# Patient Record
Sex: FEMALE | Race: BLACK OR AFRICAN AMERICAN | URBAN - METROPOLITAN AREA
[De-identification: names, ages, dates, MRNs, and addresses within clinical notes are randomized per-mention and may not be internally consistent; named-entity substitution may affect disease eponyms.]

---

## 2017-10-12 RX ORDER — ACYCLOVIR 400 MG/1
TABLET ORAL
Qty: 15 TAB | Refills: 1 | Status: SHIPPED | OUTPATIENT
Start: 2017-10-12 | End: 2019-01-04 | Stop reason: ALTCHOICE

## 2019-01-04 ENCOUNTER — HOSPITAL ENCOUNTER (OUTPATIENT)
Dept: LAB | Age: 24
Discharge: HOME | End: 2019-01-04
Payer: COMMERCIAL

## 2019-01-04 ENCOUNTER — OFFICE VISIT (OUTPATIENT)
Dept: FAMILY MEDICINE CLINIC | Age: 24
End: 2019-01-04

## 2019-01-04 VITALS
HEART RATE: 93 BPM | TEMPERATURE: 95.4 F | WEIGHT: 140 LBS | SYSTOLIC BLOOD PRESSURE: 113 MMHG | BODY MASS INDEX: 27.48 KG/M2 | HEIGHT: 60 IN | RESPIRATION RATE: 14 BRPM | DIASTOLIC BLOOD PRESSURE: 77 MMHG

## 2019-01-04 DIAGNOSIS — N93.9 ABNORMAL VAGINAL BLEEDING: ICD-10-CM

## 2019-01-04 DIAGNOSIS — Z01.419 ENCOUNTER FOR ANNUAL ROUTINE GYNECOLOGICAL EXAMINATION: Primary | ICD-10-CM

## 2019-01-04 DIAGNOSIS — Z30.41 FAMILY PLANNING, BCP (BIRTH CONTROL PILLS) MAINTENANCE: ICD-10-CM

## 2019-01-04 DIAGNOSIS — N76.0 BACTERIAL VAGINOSIS: ICD-10-CM

## 2019-01-04 DIAGNOSIS — N89.8 VAGINAL DISCHARGE: ICD-10-CM

## 2019-01-04 DIAGNOSIS — A60.00 HERPES SIMPLEX INFECTION OF GENITOURINARY SYSTEM: ICD-10-CM

## 2019-01-04 DIAGNOSIS — B96.89 BACTERIAL VAGINOSIS: ICD-10-CM

## 2019-01-04 LAB
HCG URINE, QL. (POC): NEGATIVE
VALID INTERNAL CONTROL?: YES

## 2019-01-04 PROCEDURE — 87491 CHLMYD TRACH DNA AMP PROBE: CPT

## 2019-01-04 PROCEDURE — 88175 CYTOPATH C/V AUTO FLUID REDO: CPT

## 2019-01-04 PROCEDURE — 87798 DETECT AGENT NOS DNA AMP: CPT

## 2019-01-04 RX ORDER — ETONOGESTREL AND ETHINYL ESTRADIOL 11.7; 2.7 MG/1; MG/1
INSERT, EXTENDED RELEASE VAGINAL
Qty: 1 DEVICE | Refills: 11 | Status: SHIPPED | OUTPATIENT
Start: 2019-01-04

## 2019-01-04 RX ORDER — NORGESTIMATE AND ETHINYL ESTRADIOL 0.25-0.035
KIT ORAL
Refills: 0 | COMMUNITY
Start: 2018-10-01 | End: 2019-01-04 | Stop reason: ALTCHOICE

## 2019-01-04 RX ORDER — METRONIDAZOLE 500 MG/1
500 TABLET ORAL 2 TIMES DAILY
Qty: 14 TAB | Refills: 0 | Status: SHIPPED | OUTPATIENT
Start: 2019-01-04 | End: 2019-01-11

## 2019-01-04 RX ORDER — VALACYCLOVIR HYDROCHLORIDE 500 MG/1
500 TABLET, FILM COATED ORAL DAILY
Qty: 30 TAB | Refills: 11 | Status: SHIPPED | OUTPATIENT
Start: 2019-01-04

## 2019-01-04 NOTE — PROGRESS NOTES
HISTORY OF PRESENT ILLNESS  Mando Adams is a 26 y.o. female. HPI  Ms. Pool Bardales presents for medication refill (OCP and Acyclovir) and vaginal discharge. She has never had a Pap smear. 1) C/o vaginal discharge x 1 week - c/o odor - fishy, c/o brown and white thick discharge. Admits to pelvic pain that occurred 2 days ago - resolved. 2) Desires refill of OCP. Admits to missing pills but she set an alarm to help with this. C/o bleeding x 1 month due to frequently missing pills. 3) Hx of genital herpes. She reports infrequent outbreaks but is interested in suppression to prevent transmission to her partner. LMP: 11/25/18-12/25/18. Attributed to missed pills. Review of Systems   Gastrointestinal: Positive for abdominal pain (2 days ago - resolved). Genitourinary: Negative for dysuria, frequency and urgency. /77 (BP 1 Location: Left arm, BP Patient Position: Sitting)   Pulse 93   Temp 95.4 °F (35.2 °C) (Oral)   Resp 14   Ht 5' (1.524 m)   Wt 140 lb (63.5 kg)   LMP 11/25/2018 (Approximate)   BMI 27.34 kg/m²     Physical Exam   Constitutional: She is oriented to person, place, and time. She appears well-developed and well-nourished. No distress. HENT:   Head: Normocephalic and atraumatic. Right Ear: Tympanic membrane, external ear and ear canal normal.   Left Ear: Tympanic membrane, external ear and ear canal normal.   Nose: Nose normal.   Mouth/Throat: Uvula is midline, oropharynx is clear and moist and mucous membranes are normal. No oropharyngeal exudate, posterior oropharyngeal edema, posterior oropharyngeal erythema or tonsillar abscesses. Eyes: Conjunctivae are normal. Pupils are equal, round, and reactive to light. No scleral icterus. Neck: Neck supple. Cardiovascular: Normal rate, regular rhythm and normal heart sounds. Exam reveals no gallop. No murmur heard. Pulses:       Dorsalis pedis pulses are 2+ on the right side, and 2+ on the left side.         Posterior tibial pulses are 2+ on the right side, and 2+ on the left side. No pedal edema. Pulmonary/Chest: Effort normal and breath sounds normal. No respiratory distress. She has no decreased breath sounds. She has no wheezes. She has no rhonchi. She has no rales. Abdominal: Soft. Bowel sounds are normal. She exhibits no distension and no mass. There is no tenderness. There is no rebound and no guarding. Genitourinary: Uterus normal. No labial fusion. There is no rash, tenderness, lesion or injury on the right labia. There is no rash, tenderness, lesion or injury on the left labia. Cervix exhibits discharge (clear-white) and friability. Cervix exhibits no motion tenderness. Right adnexum displays no mass, no tenderness and no fullness. Left adnexum displays no mass, no tenderness and no fullness. Vaginal discharge (thick, white - fishy odor) found. Lymphadenopathy:        Head (right side): No submandibular and no tonsillar adenopathy present. Head (left side): No submandibular and no tonsillar adenopathy present. She has no cervical adenopathy. Right: No supraclavicular adenopathy present. Left: No supraclavicular adenopathy present. Neurological: She is alert and oriented to person, place, and time. Skin: Skin is warm and dry. Psychiatric: She has a normal mood and affect. Her speech is normal.     Results for orders placed or performed in visit on 01/04/19   AMB POC URINE PREGNANCY TEST, VISUAL COLOR COMPARISON   Result Value Ref Range    VALID INTERNAL CONTROL POC Yes     HCG urine, Ql. (POC) Negative Negative       ASSESSMENT and PLAN  Orders Placed This Encounter    NUSWAB VAGINOSIS + CANDIDA     Standing Status:   Future     Standing Expiration Date:   1/5/2020    AMB POC URINE PREGNANCY TEST, VISUAL COLOR COMPARISON    ethinyl estradiol-etonogestrel (NUVARING) 0.12-0.015 mg/24 hr vaginal ring     Sig: Insert 1 ring PV x 3 week, then off x 1 week.      Dispense:  1 Device Refill:  11    valACYclovir (VALTREX) 500 mg tablet     Sig: Take 1 Tab by mouth daily. Dispense:  30 Tab     Refill:  11    metroNIDAZOLE (FLAGYL) 500 mg tablet     Sig: Take 1 Tab by mouth two (2) times a day for 7 days. No alcohol x 10 days. Dispense:  14 Tab     Refill:  0    PAP IG, CT-NG TV RFX HPV FGPI([id number], [id number])     Standing Status:   Future     Standing Expiration Date:   1/5/2020     Order Specific Question:   Pap Source? Answer:   Endocervical     Order Specific Question:   Total Hysterectomy? Answer:   No     Order Specific Question:   Supracervical Hysterectomy? Answer:   No     Order Specific Question:   Post Menopausal?     Answer:   No     Order Specific Question:   Hormone Therapy? Answer:   No     Order Specific Question:   IUD? Answer:   No     Order Specific Question:   Abnormal Bleeding? Answer:   Yes     Order Specific Question:   Pregnant     Answer:   No     Order Specific Question:   Post Partum? Answer:   No     Diagnoses and all orders for this visit:    1. Encounter for annual routine gynecological examination  -     PAP IG, CT-NG TV [address] HPV [id number], [id number]); Future    2. Vaginal discharge  -     PAP IG, CT-NG TV [address] HPV [id number], [id number]); Future  -     NUSWAB VAGINOSIS + CANDIDA; Future    3. Bacterial vaginosis  -     metroNIDAZOLE (FLAGYL) 500 mg tablet; Take 1 Tab by mouth two (2) times a day for 7 days. No alcohol x 10 days. 4. Herpes simplex infection of genitourinary system  -     valACYclovir (VALTREX) 500 mg tablet; Take 1 Tab by mouth daily.   - Increase to 1 every 12 hours x 3 days for outbreaks, then resume suppression dosage. 5. Family planning, BCP (birth control pills) maintenance  -     ethinyl estradiol-etonogestrel (NUVARING) 0.12-0.015 mg/24 hr vaginal ring; Insert 1 ring PV x 3 week, then off x 1 week. - Improved compliance.      6. Abnormal vaginal bleeding  -     AMB POC URINE PREGNANCY TEST, VISUAL COLOR COMPARISON          Follow-up Disposition:  Return if symptoms worsen or fail to improve.

## 2019-01-04 NOTE — PROGRESS NOTES
Patient presents to the clinic for a refill of her birth control and acyclovir. Patient would also like to discuss vaginal discharge that began 1 week ago. Patient complains of a thick white discharge with a fishy odor.

## 2019-01-07 LAB
C TRACH RRNA SPEC QL NAA+PROBE: NEGATIVE
N GONORRHOEA RRNA SPEC QL NAA+PROBE: NEGATIVE
SPECIMEN SOURCE: ABNORMAL
T VAGINALIS RRNA SPEC QL NAA+PROBE: POSITIVE

## 2019-01-08 LAB
A VAGINAE DNA VAG QL NAA+PROBE: ABNORMAL SCORE
BVAB2 DNA VAG QL NAA+PROBE: ABNORMAL SCORE
C ALBICANS DNA VAG QL NAA+PROBE: POSITIVE
C GLABRATA DNA VAG QL NAA+PROBE: NEGATIVE
MEGA1 DNA VAG QL NAA+PROBE: ABNORMAL SCORE
SPECIMEN SOURCE: ABNORMAL

## 2019-01-09 RX ORDER — FLUCONAZOLE 150 MG/1
150 TABLET ORAL DAILY
Qty: 1 TAB | Refills: 0 | Status: SHIPPED | OUTPATIENT
Start: 2019-01-09 | End: 2019-01-10

## 2019-01-09 NOTE — PROGRESS NOTES
Call made to pt, both name and  verified. Pt was advised of results and treatment plan. Pt verbalized understanding and had no further questions or concerns.

## 2019-01-09 NOTE — PROGRESS NOTES
Please notify patient I received her Pap smear result. It was abnormal, and I recommend we refer her to OBGYN. They will need to do colposcopy which is a biopsy of her cervix. Please let me know if she has a preferred OBGYN or if she wishes for me to choose.

## 2019-01-09 NOTE — PROGRESS NOTES
Please notify patient that her culture results were positive for BV, yeast, and trichomonas which is an STD. I prescribed Metronidazole 500 mg BID x 7 days at her visit. This will treat both the BV and the trichomonas. It is imperative that she take and complete this. I am also prescribing Diflucan for yeast infection. Take one pill once to treat this. Lastly, please advise that all her sexual partners be treated for trichomonas as well. She needs to abstain from sexual activity until all symptoms resolve.

## 2019-01-11 ENCOUNTER — TELEPHONE (OUTPATIENT)
Dept: FAMILY MEDICINE CLINIC | Age: 24
End: 2019-01-11

## 2019-01-11 DIAGNOSIS — R85.619 ABNORMAL PAP SMEAR OF ANUS: Primary | ICD-10-CM

## 2019-01-11 DIAGNOSIS — R87.612 LOW GRADE SQUAMOUS INTRAEPITHELIAL LESION (LGSIL) ON CERVICAL PAP SMEAR: ICD-10-CM

## 2019-01-11 NOTE — PROGRESS NOTES
Patient called back. Let her know her pap came back abnormal and will need to see an OBGYN. She stated she would like Astrid to choose the OBGYN. verbalized understanding and had no further questions.

## 2019-01-11 NOTE — TELEPHONE ENCOUNTER
----- Message from Leafy Opitz sent at 1/11/2019 12:19 PM EST -----  Patient called back. Let her know her pap came back abnormal and will need to see an OBGYN. She stated she would like Astrid to choose the OBGYN. verbalized understanding and had no further questions.

## 2019-01-23 DIAGNOSIS — B96.89 BACTERIAL VAGINOSIS: ICD-10-CM

## 2019-01-23 DIAGNOSIS — N76.0 BACTERIAL VAGINOSIS: ICD-10-CM

## 2019-01-23 RX ORDER — METRONIDAZOLE 500 MG/1
500 TABLET ORAL 2 TIMES DAILY
Qty: 14 TAB | Refills: 0 | OUTPATIENT
Start: 2019-01-23 | End: 2019-01-30

## 2019-01-23 NOTE — TELEPHONE ENCOUNTER
Called patient, let her know she will need to come in in order to get medication refilled. Patient verbalized understanding and det up appointment for this upcoming Friday.

## 2019-01-23 NOTE — TELEPHONE ENCOUNTER
Please call patient as she should have just completed this regimen a few weeks ago. She will likely need to be set up for an appt.

## 2019-01-25 ENCOUNTER — OFFICE VISIT (OUTPATIENT)
Dept: FAMILY MEDICINE CLINIC | Age: 24
End: 2019-01-25

## 2019-01-25 ENCOUNTER — HOSPITAL ENCOUNTER (OUTPATIENT)
Dept: LAB | Age: 24
Discharge: HOME | End: 2019-01-25
Payer: COMMERCIAL

## 2019-01-25 VITALS
HEART RATE: 66 BPM | BODY MASS INDEX: 27.48 KG/M2 | RESPIRATION RATE: 18 BRPM | SYSTOLIC BLOOD PRESSURE: 110 MMHG | TEMPERATURE: 96.1 F | DIASTOLIC BLOOD PRESSURE: 104 MMHG | WEIGHT: 140 LBS | HEIGHT: 60 IN

## 2019-01-25 DIAGNOSIS — Z20.2 EXPOSURE TO TRICHOMONAS: ICD-10-CM

## 2019-01-25 DIAGNOSIS — N89.8 VAGINAL DISCHARGE: ICD-10-CM

## 2019-01-25 DIAGNOSIS — N89.8 VAGINAL DISCHARGE: Primary | ICD-10-CM

## 2019-01-25 DIAGNOSIS — R87.612 LOW GRADE SQUAMOUS INTRAEPITHELIAL LESION ON CYTOLOGIC SMEAR OF CERVIX (LGSIL): ICD-10-CM

## 2019-01-25 PROCEDURE — 87798 DETECT AGENT NOS DNA AMP: CPT

## 2019-01-25 RX ORDER — METRONIDAZOLE 500 MG/1
2000 TABLET ORAL ONCE
Qty: 4 TAB | Refills: 0 | Status: SHIPPED | OUTPATIENT
Start: 2019-01-25 | End: 2019-01-25

## 2019-01-25 NOTE — PROGRESS NOTES
HISTORY OF PRESENT ILLNESS  Logan Richards is a 26 y.o. female. HPI  Ms. Jesse Bullock returns to care complaining of a return of her vaginal discharge. She was recently treated for trichomonas, BV, and a yeast infection. She reports she completed her prescriptions as directed. She believed her boyfriend had sought treatment as well, but after they were active again, he admitted to her that he had not received treatment. She believes she has trichomonas again. She has not heard back regarding her OBGYN referral for LSIL. LMP: 1/13/19     Review of Systems   Gastrointestinal: Negative for abdominal pain. BP (!) 110/104 (BP 1 Location: Left arm, BP Patient Position: Sitting)   Pulse 66   Temp 96.1 °F (35.6 °C) (Oral)   Resp 18   Ht 5' (1.524 m)   Wt 140 lb (63.5 kg)   LMP 01/13/2019 (Approximate)   BMI 27.34 kg/m²     Physical Exam   Constitutional: She is oriented to person, place, and time. She appears well-developed and well-nourished. No distress. HENT:   Head: Normocephalic and atraumatic. Genitourinary: Cervix exhibits discharge (white). Cervix exhibits no friability. No erythema, tenderness or bleeding in the vagina. No foreign body in the vagina. No signs of injury around the vagina. Vaginal discharge (white) found. Neurological: She is alert and oriented to person, place, and time. Skin: Skin is warm and dry. Psychiatric: She has a normal mood and affect. Her behavior is normal.       ASSESSMENT and PLAN  Orders Placed This Encounter    metroNIDAZOLE (FLAGYL) 500 mg tablet     Sig: Take 4 Tabs by mouth once for 1 dose. Dispense:  4 Tab     Refill:  0     Diagnoses and all orders for this visit:    1. Vaginal discharge  2. Exposure to trichomonas  -     metroNIDAZOLE (FLAGYL) 500 mg tablet; Take 4 Tabs by mouth once for 1 dose.   -     NUSWAB VAGINITIS PLUS; Future  - Advised patient to contact me with her boyfriend's name, medication list, and drug allergy list, and I will prescribe him treatment of Trichomonas. He may also seek treatment through the health department. - Advised of STD testing through blood work. She wishes to return for this. 3. 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 Importance of OBGYN f/u discussed. - She has not yet heard back regarding this referral.  confirms successful fax of referral. Contact information provided to patient. She will call to schedule and will let us know if she has any problems. Follow-up Disposition:  Return if symptoms worsen or fail to improve.

## 2019-01-25 NOTE — PROGRESS NOTES
Rm:7    Chief Complaint   Patient presents with    Vaginal Discharge     pt presents to office with c/o vaginal discharge     Depression Screening:  PHQ over the last two weeks 1/25/2019 1/4/2019 1/28/2016   Little interest or pleasure in doing things Not at all Not at all Not at all   Feeling down, depressed, irritable, or hopeless Not at all Not at all Not at all   Total Score PHQ 2 0 0 0       Learning Assessment:  Learning Assessment 1/28/2016   PRIMARY LEARNER Patient   HIGHEST LEVEL OF EDUCATION - PRIMARY LEARNER  SOME COLLEGE   BARRIERS PRIMARY LEARNER NONE   CO-LEARNER CAREGIVER No   PRIMARY LANGUAGE ENGLISH   LEARNER PREFERENCE PRIMARY DEMONSTRATION   ANSWERED BY Patient   RELATIONSHIP SELF       Abuse Screening:  No flowsheet data found. Health Maintenance reviewed and discussed per provider: yes     Coordination of Care:    1. Have you been to the ER, urgent care clinic since your last visit? Hospitalized since your last visit? no    2. Have you seen or consulted any other health care providers outside of the [address] [place] [address] since your last visit? Include any pap smears or colon screening.  no

## 2019-01-25 NOTE — PATIENT INSTRUCTIONS
1) I have referred you to: [address]   [phone]      2) I will need your boyfriend's name, medication list, and allergy list to prescribe STD treatment for him.

## 2019-01-29 LAB
A VAGINAE DNA VAG QL NAA+PROBE: ABNORMAL SCORE
BVAB2 DNA VAG QL NAA+PROBE: ABNORMAL SCORE
C ALBICANS DNA VAG QL NAA+PROBE: POSITIVE
C GLABRATA DNA VAG QL NAA+PROBE: NEGATIVE
C TRACH RRNA SPEC QL NAA+PROBE: NEGATIVE
MEGA1 DNA VAG QL NAA+PROBE: ABNORMAL SCORE
N GONORRHOEA RRNA SPEC QL NAA+PROBE: NEGATIVE
SPECIMEN SOURCE: ABNORMAL
T VAGINALIS RRNA SPEC QL NAA+PROBE: NEGATIVE

## 2019-01-30 ENCOUNTER — TELEPHONE (OUTPATIENT)
Dept: FAMILY MEDICINE CLINIC | Age: 24
End: 2019-01-30

## 2019-01-30 RX ORDER — METRONIDAZOLE 500 MG/1
500 TABLET ORAL 2 TIMES DAILY
Qty: 14 TAB | Refills: 0 | Status: SHIPPED | OUTPATIENT
Start: 2019-01-30 | End: 2019-02-06

## 2019-01-30 RX ORDER — FLUCONAZOLE 150 MG/1
150 TABLET ORAL DAILY
Qty: 1 TAB | Refills: 0 | Status: SHIPPED | OUTPATIENT
Start: 2019-01-30 | End: 2019-01-31

## 2019-01-30 NOTE — TELEPHONE ENCOUNTER
Call made to pt, both name and  verified. Pt was advised of previous result message from JOSE DAVID Dejesus. Pt verbalized understanding and had no further questions or concerns.

## 2019-01-30 NOTE — TELEPHONE ENCOUNTER
Please notify Ms. Lewis Pyle that her trichomonas actually tested negative. However, she tested positive for bacterial vaginosis (BV) and a yeast infection. I sent 2 prescriptions for these to her pharmacy. For the BV, we will do a longer Flagyl regimen. Avoid alcohol with this. For the yeast infection, she will take 1 Diflucan tablet. Avoid intercourse until regimens are completed and symptoms have resolved.